# Patient Record
Sex: FEMALE | Race: WHITE | NOT HISPANIC OR LATINO | Employment: FULL TIME | URBAN - METROPOLITAN AREA
[De-identification: names, ages, dates, MRNs, and addresses within clinical notes are randomized per-mention and may not be internally consistent; named-entity substitution may affect disease eponyms.]

---

## 2024-09-04 DIAGNOSIS — R22.1 LOCALIZED SWELLING, MASS AND LUMP, NECK: Primary | ICD-10-CM

## 2024-10-03 ENCOUNTER — OFFICE VISIT (OUTPATIENT)
Dept: OTOLARYNGOLOGY | Facility: CLINIC | Age: 49
End: 2024-10-03

## 2024-10-03 VITALS
HEIGHT: 65 IN | SYSTOLIC BLOOD PRESSURE: 133 MMHG | BODY MASS INDEX: 25.05 KG/M2 | OXYGEN SATURATION: 100 % | DIASTOLIC BLOOD PRESSURE: 77 MMHG | WEIGHT: 150.38 LBS | RESPIRATION RATE: 20 BRPM | TEMPERATURE: 96 F | HEART RATE: 88 BPM

## 2024-10-03 DIAGNOSIS — Q18.0 BRANCHIAL CLEFT CYST: Primary | ICD-10-CM

## 2024-10-03 DIAGNOSIS — L02.11 ABSCESS, NECK: ICD-10-CM

## 2024-10-03 DIAGNOSIS — R22.1 LOCALIZED SWELLING, MASS AND LUMP, NECK: ICD-10-CM

## 2024-10-03 PROCEDURE — 99214 OFFICE O/P EST MOD 30 MIN: CPT | Mod: PBBFAC

## 2024-10-03 RX ORDER — AMOXICILLIN AND CLAVULANATE POTASSIUM 875; 125 MG/1; MG/1
1 TABLET, FILM COATED ORAL 2 TIMES DAILY
Qty: 20 TABLET | Refills: 0 | Status: SHIPPED | OUTPATIENT
Start: 2024-10-03 | End: 2024-10-13

## 2024-10-03 RX ORDER — ESCITALOPRAM OXALATE 20 MG/1
TABLET, FILM COATED ORAL
COMMUNITY
Start: 1999-10-29

## 2024-10-03 RX ORDER — LIDOCAINE HYDROCHLORIDE 40 MG/ML
SOLUTION TOPICAL
Status: DISCONTINUED
Start: 2024-10-03 | End: 2024-10-03 | Stop reason: HOSPADM

## 2024-10-03 RX ORDER — TRAZODONE HYDROCHLORIDE 100 MG/1
50 TABLET ORAL
COMMUNITY
Start: 2024-09-26

## 2024-10-03 NOTE — H&P (VIEW-ONLY)
[facility]  Otolaryngology-Head & Neck Surgery    Office Visit    Patient Name: Sheree Carter  MRN: [id number]  YOB: 1975  Date of Encounter: 10/03/2024  Physician: Lou Gomez MD      CC: right sided neck mass    HPI: Sheree Carter is a 49 y.o. female who presents with new right sided neck mass. She states it has been present since June and has been slowly growing. She states it began after a right sided ear infection that she took antibiotics for. She denies any other previous head or neck masses or surgeries. She denies skin changes. She reports it grows in size throughout the day and the shrinks overnight. She denies any heart or lung problems. No family history of any similar problems. She reports she does smoke, about 3 cigarettes a day for about 30 years. She denies alcohol or illicit drug use. She is not sexually active. She denies dyspnea, dysphagia, dysphonia. She does reports some right sided nasal congestion and ear fullness.    Review of patient's allergies indicates:  No Known Allergies    No past medical history on file.    No past surgical history on file.    Social History     Socioeconomic History    Marital status: Single   Tobacco Use    Smoking status: Some Days     Types: Cigarettes    Smokeless tobacco: Never       No family history on file.    PHYSICAL EXAM:  Vitals:    10/03/24 0911   BP: 133/77   Pulse: 88   Resp: 20   Temp: 96.3 °F (35.7 °C)       General Appearance: well nourished, well-developed, alert, oriented, in no acute distress, no dysphonia  Head/Face: Normocephalic, atraumatic  Eyes: EOMI, normal conjunctiva  Ears: Hears well at normal conversation volume  Otomicroscopy:  AD: external normal, ear canal normal, TM intact without effusion or retraction  AS: external normal, ear canal normal, TM intact without effusion or retraction  Nose: External nose normal, septum midline, mild inferior turbinate hypertrophy, no epistaxis  Oral  Cavity & Oropharynx: Lips normal. Tongue without masses or lesions. Dentition fair. Oropharynx unremarkable. No masses, lesions, or leukoplakia. Floor of mouth and base of tongue are soft.   Neck: right-sided neck mass along SCM, well circumscribed, no fluctuant, no skin changes, not tender palpation, Soft, no palpable lymph nodes. Thyroid without nodules or goiter.   Respiratory: Nonlabored breathing on room air. No stridor or stertor.   Neuro: CN II-XII intact.     PROCEDURE: Flexible fiberoptic laryngoscopy    Surgeon: Lou Gomez   Complications: None  Date: 10/03/2024  Indication: neck mass    Procedure in Detail:    Informed consent was obtained from the patient after explanation of procedure, indications, risks and benefits. Flexible laryngoscopy was performed on [facility] through the right nasal passage(s). The nasal cavity, nasopharynx, oropharynx, hypopharynx and larynx were adequately visualized. The true vocal cords and arytenoids were examined during phonation and repose.    Operative Findings:    Nasal Cavity: nasal airway patent without lesions or ulcerations  Nasopharynx: No adenoid hypertrophy, no masses or ulcerations noted in the fossae of Rosenmüller, patent appearing carla tubarii  Tongue Base: No fullness or lingual tonsillar hypertrophy. No masses.  Pharyngeal Walls: No lesions or ulcerations noted  Epiglottis / Aryepiglottic Folds: Crisp epiglottis without lesions, normal-appearing aryepiglottic folds without lesions.   Arytenoids: Full symmetric range of motion bilaterally, no lesions  Piriform Sinus: No lesions or masses noted  Vocal Cords: Symmetric movement bilaterally with good glottic closure, patent airway during inspiration and expiration, no lesions or masses    The patient tolerated the procedure well without any complications.    PROCEDURE: Fine Needle Aspiration    Surgeon: Lou Gomez and Osiel Jarvis  Complications: None  Date: 10/03/2024  Indication: neck  mass    Procedure in Detail:    Informed consent was obtained from patient after explanation of procedure, indications, risks, and benefits. Area was numbed with 5 cc of 1% lidocaine with epinephrine and then sterilely prepped and draped. Using a 22G and 18G needle, fluid was aspirated from within the mass and placed along slides to be sent for cytology as well as cultures obtained. Approximately 25cc of purulence was aspirated. She tolerated the procedure well and without complications.      PERTINENT DATA:  [facility] CT films reviewed: right sided well circumscribed fluid filled mass    ASSESSMENT:  Sheree Carter is a 49 y.o. 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 In office FNA performed with purulence aspirated. Cultures obtained.    PLAN:  -- Follow up cytology and cultures from needle aspiration performed in clinic  -- Augmentin prescription sent to patient's pharmacy    Return to clinic in 1 week    Lou Gomez MD  [employer] Otolaryngology PGY-3  9:46 AM 10/03/2024

## 2024-10-04 DIAGNOSIS — R22.1 LOCALIZED SWELLING, MASS AND LUMP, NECK: Primary | ICD-10-CM

## 2024-10-05 LAB
BACTERIA SPEC ANAEROBE CULT: NORMAL
BACTERIA WND CULT: NORMAL

## 2024-10-10 ENCOUNTER — OFFICE VISIT (OUTPATIENT)
Dept: OTOLARYNGOLOGY | Facility: CLINIC | Age: 49
End: 2024-10-10

## 2024-10-10 DIAGNOSIS — R22.1 LOCALIZED SWELLING, MASS AND LUMP, NECK: Primary | ICD-10-CM

## 2024-10-10 DIAGNOSIS — Q18.0 BRANCHIAL CLEFT CYST: ICD-10-CM

## 2024-10-10 PROCEDURE — 99213 OFFICE O/P EST LOW 20 MIN: CPT | Mod: PBBFAC | Performed by: OTOLARYNGOLOGY

## 2024-10-10 RX ORDER — CEFAZOLIN SODIUM 2 G/50ML
2 SOLUTION INTRAVENOUS
OUTPATIENT
Start: 2024-10-10

## 2024-10-10 RX ORDER — AMOXICILLIN AND CLAVULANATE POTASSIUM 875; 125 MG/1; MG/1
TABLET, FILM COATED ORAL
Qty: 20 TABLET | Refills: 0 | Status: SHIPPED | OUTPATIENT
Start: 2024-10-10

## 2024-10-10 NOTE — PROGRESS NOTES
[facility]  Otolaryngology-Head & Neck Surgery    Office Visit    Patient Name: Sheree Carter  MRN: [id number]  YOB: 1975  Date of Encounter: 10/10/2024  Physician: Osiel Jarvis MD      CC:  Follow-up for right neck mass    HPI: 10/3/2024:  Sheree Carter is a 49 y.o. female who presents with new right sided neck mass. She states it has been present since June and has been slowly growing. She states it began after a right sided ear infection that she took antibiotics for. She denies any other previous head or neck masses or surgeries. She denies skin changes. She reports it grows in size throughout the day and the shrinks overnight. She denies any heart or lung problems. No family history of any similar problems. She reports she does smoke, about 3 cigarettes a day for about 30 years. She denies alcohol or illicit drug use. She is not sexually active. She denies dyspnea, dysphagia, dysphonia. She does reports some right sided nasal congestion and ear fullness.    10/10/2024:   Patient presents today for follow-up in review FNA and culture results from the right neck mass.  Patient has noticed a recurrence of the swelling following her needle aspiration last week.  She has no pain, tenderness, fever, or drainage.  She does not have any other new problems today.  FNA results of the right neck mass did show squamous epithelial cells, inflammatory cells, and macrophages in a background of debris.  Differential diagnosis was noted include branchial cleft cyst but there is also possibility of cystic squamous cell carcinoma.  Culture showed no growth.  Results were discussed with the patient.    Review of patient's allergies indicates:  No Known Allergies    History reviewed. No pertinent past medical history.    History reviewed. No pertinent surgical history.    Social History     Socioeconomic History    Marital status: Single   Tobacco Use    Smoking status: Some  Days     Types: Cigarettes    Smokeless tobacco: Never       No family history on file.    PHYSICAL EXAM:  There were no vitals filed for this visit.      General Appearance: well nourished, well-developed, alert, oriented, in no acute distress, no dysphonia  Head/Face: Normocephalic, atraumatic  Eyes: EOMI, normal conjunctiva  Ears: Hears well at normal conversation volume  Otomicroscopy:  AD: external normal, ear canal normal, TM intact without effusion or retraction  AS: external normal, ear canal normal, TM intact without effusion or retraction  Nose: External nose normal, septum midline, mild inferior turbinate hypertrophy, no epistaxis  Oral Cavity & Oropharynx: Lips normal. Tongue without masses or lesions. Dentition fair. Oropharynx unremarkable. No masses, lesions, or leukoplakia. Floor of mouth and base of tongue are soft.   Neck: right-sided neck mass along SCM, well circumscribed, fluctuant, no skin changes, not tender palpation, Soft, no palpable lymph nodes. Thyroid without nodules or goiter.   Respiratory: Nonlabored breathing on room air. No stridor or stertor.   Neuro: CN II-XII intact.     PROCEDURE: Flexible fiberoptic laryngoscopy (date:10/3/2024)    Complications: None  Indication: neck mass    Procedure in Detail:    Informed consent was obtained from the patient after explanation of procedure, indications, risks and benefits. Flexible laryngoscopy was performed on [facility] through the right nasal passage(s). The nasal cavity, nasopharynx, oropharynx, hypopharynx and larynx were adequately visualized. The true vocal cords and arytenoids were examined during phonation and repose.    Operative Findings:    Nasal Cavity: nasal airway patent without lesions or ulcerations  Nasopharynx: No adenoid hypertrophy, no masses or ulcerations noted in the fossae of Rosenmüller, patent appearing carla tubarii  Tongue Base: No fullness or lingual tonsillar hypertrophy. No masses.  Pharyngeal Walls: No  lesions or ulcerations noted  Epiglottis / Aryepiglottic Folds: Crisp epiglottis without lesions, normal-appearing aryepiglottic folds without lesions.   Arytenoids: Full symmetric range of motion bilaterally, no lesions  Piriform Sinus: No lesions or masses noted  Vocal Cords: Symmetric movement bilaterally with good glottic closure, patent airway during inspiration and expiration, no lesions or masses    The patient tolerated the procedure well without any complications.      PERTINENT DATA:  OS[facility] CT films reviewed: right sided well circumscribed fluid filled mass    ASSESSMENT:  49-year-old female with several month history of right-sided neck mass which is likely a branchial cleft cyst but the possibility of squamous cell carcinoma also exist.    PLAN:  Recommend direct laryngoscopy for further evaluation of the upper aerodigestive tract with biopsies of any area that are suspicious for carcinoma.  If no suspicious areas are noted would then proceed with excision of the right cervical mass/cyst.  If there is a suspicious lesion present then would perform the direct laryngoscopy and biopsies only and defer further treatment of the neck mass until final pathology results were available.  Patient does understand and does agree to this approach.  Risks of the procedure including bleeding, infection, injury to the teeth, injury to the spinal accessory nerve with shoulder weakness, injury to the marginal mandibular nerve with lip weakness, injury to the hypoglossal nerve with altered speech and difficulty swallowing, injury to the vagus nerve with hoarseness, and injury to the cervical sympathetic trunk with Ekaterina syndrome was also discussed.  Is also discuss that dysphagia maybe a postoperative complication if there is injury to the 9 and 10.  Patient understands and would like to proceed.  Surgery is scheduled for 11/1/2024.  She will be placed on Augmentin 875 1 tablet p.o. b.i.d. to begin 1 week preoperatively.   He will be seen for follow-up 3-4 days postoperatively for drain pull.  Consent was obtained in clinic.    Osiel Jarvis MD

## 2024-10-17 ENCOUNTER — ANESTHESIA EVENT (OUTPATIENT)
Dept: SURGERY | Facility: HOSPITAL | Age: 49
End: 2024-10-17

## 2024-10-31 ENCOUNTER — PATIENT MESSAGE (OUTPATIENT)
Dept: SURGERY | Facility: HOSPITAL | Age: 49
End: 2024-10-31

## 2024-11-01 ENCOUNTER — HOSPITAL ENCOUNTER (OUTPATIENT)
Facility: HOSPITAL | Age: 49
Discharge: HOME | End: 2024-11-01
Attending: OTOLARYNGOLOGY | Admitting: OTOLARYNGOLOGY

## 2024-11-01 ENCOUNTER — ANESTHESIA (OUTPATIENT)
Dept: SURGERY | Facility: HOSPITAL | Age: 49
End: 2024-11-01

## 2024-11-01 DIAGNOSIS — Q18.0 BRANCHIAL CLEFT CYST: ICD-10-CM

## 2024-11-01 DIAGNOSIS — R22.1 LOCALIZED SWELLING, MASS AND LUMP, NECK: ICD-10-CM

## 2024-11-01 DIAGNOSIS — R22.1 MASS OF LATERAL NECK: ICD-10-CM

## 2024-11-01 DIAGNOSIS — Z01.818 PRE-OP EVALUATION: ICD-10-CM

## 2024-11-01 LAB
B-HCG UR QL: NEGATIVE
BASOPHILS # BLD AUTO: 0.07 X10(3)/MCL
BASOPHILS NFR BLD AUTO: 1.3 %
CTP QC/QA: YES
EOSINOPHIL # BLD AUTO: 0.24 X10(3)/MCL (ref 0–0.9)
EOSINOPHIL NFR BLD AUTO: 4.4 %
ERYTHROCYTE [DISTWIDTH] IN BLOOD BY AUTOMATED COUNT: 12.3 % (ref 11.5–17)
HCT VFR BLD AUTO: 37.2 % (ref 37–47)
HGB BLD-MCNC: 12.7 G/DL (ref 12–16)
IMM GRANULOCYTES # BLD AUTO: 0 X10(3)/MCL (ref 0–0.04)
IMM GRANULOCYTES NFR BLD AUTO: 0 %
LYMPHOCYTES # BLD AUTO: 2.39 X10(3)/MCL (ref 0.6–4.6)
LYMPHOCYTES NFR BLD AUTO: 43.9 %
MCH RBC QN AUTO: 30.8 PG (ref 27–31)
MCHC RBC AUTO-ENTMCNC: 34.1 G/DL (ref 33–36)
MCV RBC AUTO: 90.3 FL (ref 80–94)
MONOCYTES # BLD AUTO: 0.53 X10(3)/MCL (ref 0.1–1.3)
MONOCYTES NFR BLD AUTO: 9.7 %
NEUTROPHILS # BLD AUTO: 2.22 X10(3)/MCL (ref 2.1–9.2)
NEUTROPHILS NFR BLD AUTO: 40.7 %
NRBC BLD AUTO-RTO: 0 %
PLATELET # BLD AUTO: 222 X10(3)/MCL (ref 130–400)
PMV BLD AUTO: 11.3 FL (ref 7.4–10.4)
RBC # BLD AUTO: 4.12 X10(6)/MCL (ref 4.2–5.4)
WBC # BLD AUTO: 5.45 X10(3)/MCL (ref 4.5–11.5)

## 2024-11-01 PROCEDURE — 63600175 PHARM REV CODE 636 W HCPCS: Performed by: OTOLARYNGOLOGY

## 2024-11-01 PROCEDURE — 36000709 HC OR TIME LEV III EA ADD 15 MIN: Performed by: OTOLARYNGOLOGY

## 2024-11-01 PROCEDURE — 36000708 HC OR TIME LEV III 1ST 15 MIN: Performed by: OTOLARYNGOLOGY

## 2024-11-01 PROCEDURE — 93005 ELECTROCARDIOGRAM TRACING: CPT

## 2024-11-01 PROCEDURE — 27201423 OPTIME MED/SURG SUP & DEVICES STERILE SUPPLY: Performed by: OTOLARYNGOLOGY

## 2024-11-01 PROCEDURE — 88305 TISSUE EXAM BY PATHOLOGIST: CPT | Mod: TC | Performed by: OTOLARYNGOLOGY

## 2024-11-01 PROCEDURE — 71000016 HC POSTOP RECOV ADDL HR: Performed by: OTOLARYNGOLOGY

## 2024-11-01 PROCEDURE — 81025 URINE PREGNANCY TEST: CPT | Performed by: NURSE PRACTITIONER

## 2024-11-01 PROCEDURE — D9220A PRA ANESTHESIA: Mod: ,,, | Performed by: SPECIALIST

## 2024-11-01 PROCEDURE — 85025 COMPLETE CBC W/AUTO DIFF WBC: CPT | Performed by: OTOLARYNGOLOGY

## 2024-11-01 PROCEDURE — 63600175 PHARM REV CODE 636 W HCPCS: Performed by: NURSE ANESTHETIST, CERTIFIED REGISTERED

## 2024-11-01 PROCEDURE — 63600175 PHARM REV CODE 636 W HCPCS: Performed by: SPECIALIST

## 2024-11-01 PROCEDURE — 25000003 PHARM REV CODE 250: Performed by: NURSE ANESTHETIST, CERTIFIED REGISTERED

## 2024-11-01 PROCEDURE — 25000003 PHARM REV CODE 250: Performed by: SPECIALIST

## 2024-11-01 PROCEDURE — 37000008 HC ANESTHESIA 1ST 15 MINUTES: Performed by: OTOLARYNGOLOGY

## 2024-11-01 PROCEDURE — 37000009 HC ANESTHESIA EA ADD 15 MINS: Performed by: OTOLARYNGOLOGY

## 2024-11-01 PROCEDURE — 71000015 HC POSTOP RECOV 1ST HR: Performed by: OTOLARYNGOLOGY

## 2024-11-01 PROCEDURE — 71000033 HC RECOVERY, INTIAL HOUR: Performed by: OTOLARYNGOLOGY

## 2024-11-01 RX ORDER — PROPOFOL 10 MG/ML
VIAL (ML) INTRAVENOUS
Status: DISCONTINUED | OUTPATIENT
Start: 2024-11-01 | End: 2024-11-01

## 2024-11-01 RX ORDER — HYDROMORPHONE HYDROCHLORIDE 1 MG/ML
0.2 INJECTION, SOLUTION INTRAMUSCULAR; INTRAVENOUS; SUBCUTANEOUS EVERY 5 MIN PRN
Status: DISCONTINUED | OUTPATIENT
Start: 2024-11-01 | End: 2024-11-01 | Stop reason: HOSPADM

## 2024-11-01 RX ORDER — GLUCAGON 1 MG
1 KIT INJECTION
Status: DISCONTINUED | OUTPATIENT
Start: 2024-11-01 | End: 2024-11-01 | Stop reason: HOSPADM

## 2024-11-01 RX ORDER — HYDROMORPHONE HYDROCHLORIDE 1 MG/ML
0.5 INJECTION, SOLUTION INTRAMUSCULAR; INTRAVENOUS; SUBCUTANEOUS EVERY 5 MIN PRN
Status: DISCONTINUED | OUTPATIENT
Start: 2024-11-01 | End: 2024-11-01 | Stop reason: HOSPADM

## 2024-11-01 RX ORDER — MIDAZOLAM HYDROCHLORIDE 2 MG/2ML
2 INJECTION, SOLUTION INTRAMUSCULAR; INTRAVENOUS ONCE AS NEEDED
Status: COMPLETED | OUTPATIENT
Start: 2024-11-01 | End: 2024-11-01

## 2024-11-01 RX ORDER — AMOXICILLIN AND CLAVULANATE POTASSIUM 875; 125 MG/1; MG/1
1 TABLET, FILM COATED ORAL EVERY 12 HOURS
Qty: 10 TABLET | Refills: 0 | Status: SHIPPED | OUTPATIENT
Start: 2024-11-01 | End: 2024-11-06

## 2024-11-01 RX ORDER — IBUPROFEN 800 MG/1
800 TABLET ORAL EVERY 6 HOURS PRN
Qty: 30 TABLET | Refills: 0 | Status: SHIPPED | OUTPATIENT
Start: 2024-11-01

## 2024-11-01 RX ORDER — KETAMINE HCL IN 0.9 % NACL 50 MG/5 ML
SYRINGE (ML) INTRAVENOUS
Status: DISCONTINUED | OUTPATIENT
Start: 2024-11-01 | End: 2024-11-01

## 2024-11-01 RX ORDER — MEPERIDINE HYDROCHLORIDE 25 MG/ML
12.5 INJECTION INTRAMUSCULAR; INTRAVENOUS; SUBCUTANEOUS ONCE
Status: DISCONTINUED | OUTPATIENT
Start: 2024-11-01 | End: 2024-11-01 | Stop reason: HOSPADM

## 2024-11-01 RX ORDER — ACETAMINOPHEN 325 MG/1
650 TABLET ORAL EVERY 6 HOURS PRN
Qty: 60 TABLET | Refills: 0 | Status: SHIPPED | OUTPATIENT
Start: 2024-11-01

## 2024-11-01 RX ORDER — DIPHENHYDRAMINE HYDROCHLORIDE 50 MG/ML
25 INJECTION INTRAMUSCULAR; INTRAVENOUS ONCE AS NEEDED
Status: DISCONTINUED | OUTPATIENT
Start: 2024-11-01 | End: 2024-11-01 | Stop reason: HOSPADM

## 2024-11-01 RX ORDER — ONDANSETRON HYDROCHLORIDE 2 MG/ML
INJECTION, SOLUTION INTRAVENOUS
Status: DISCONTINUED | OUTPATIENT
Start: 2024-11-01 | End: 2024-11-01

## 2024-11-01 RX ORDER — ROCURONIUM BROMIDE 10 MG/ML
INJECTION, SOLUTION INTRAVENOUS
Status: DISCONTINUED | OUTPATIENT
Start: 2024-11-01 | End: 2024-11-01

## 2024-11-01 RX ORDER — PHENYLEPHRINE HYDROCHLORIDE 10 MG/ML
INJECTION INTRAVENOUS
Status: DISCONTINUED | OUTPATIENT
Start: 2024-11-01 | End: 2024-11-01

## 2024-11-01 RX ORDER — PROCHLORPERAZINE EDISYLATE 5 MG/ML
5 INJECTION INTRAMUSCULAR; INTRAVENOUS ONCE AS NEEDED
Status: DISCONTINUED | OUTPATIENT
Start: 2024-11-01 | End: 2024-11-01 | Stop reason: HOSPADM

## 2024-11-01 RX ORDER — SODIUM CHLORIDE, SODIUM LACTATE, POTASSIUM CHLORIDE, CALCIUM CHLORIDE 600; 310; 30; 20 MG/100ML; MG/100ML; MG/100ML; MG/100ML
INJECTION, SOLUTION INTRAVENOUS CONTINUOUS
OUTPATIENT
Start: 2024-11-01

## 2024-11-01 RX ORDER — DEXAMETHASONE SODIUM PHOSPHATE 4 MG/ML
INJECTION, SOLUTION INTRA-ARTICULAR; INTRALESIONAL; INTRAMUSCULAR; INTRAVENOUS; SOFT TISSUE
Status: DISCONTINUED | OUTPATIENT
Start: 2024-11-01 | End: 2024-11-01

## 2024-11-01 RX ORDER — EPHEDRINE SULFATE 50 MG/ML
INJECTION, SOLUTION INTRAVENOUS
Status: DISCONTINUED | OUTPATIENT
Start: 2024-11-01 | End: 2024-11-01

## 2024-11-01 RX ORDER — IPRATROPIUM BROMIDE AND ALBUTEROL SULFATE 2.5; .5 MG/3ML; MG/3ML
3 SOLUTION RESPIRATORY (INHALATION) ONCE AS NEEDED
Status: DISCONTINUED | OUTPATIENT
Start: 2024-11-01 | End: 2024-11-01 | Stop reason: HOSPADM

## 2024-11-01 RX ORDER — ONDANSETRON 4 MG/1
4 TABLET, ORALLY DISINTEGRATING ORAL 2 TIMES DAILY
Qty: 10 TABLET | Refills: 0 | Status: SHIPPED | OUTPATIENT
Start: 2024-11-01

## 2024-11-01 RX ORDER — FENTANYL CITRATE 50 UG/ML
INJECTION, SOLUTION INTRAMUSCULAR; INTRAVENOUS
Status: DISCONTINUED | OUTPATIENT
Start: 2024-11-01 | End: 2024-11-01

## 2024-11-01 RX ORDER — OXYCODONE AND ACETAMINOPHEN 5; 325 MG/1; MG/1
2 TABLET ORAL ONCE
Status: COMPLETED | OUTPATIENT
Start: 2024-11-01 | End: 2024-11-01

## 2024-11-01 RX ORDER — LIDOCAINE HYDROCHLORIDE 20 MG/ML
INJECTION INTRAVENOUS
Status: DISCONTINUED | OUTPATIENT
Start: 2024-11-01 | End: 2024-11-01

## 2024-11-01 RX ORDER — CEFAZOLIN SODIUM 1 G/3ML
2 INJECTION, POWDER, FOR SOLUTION INTRAMUSCULAR; INTRAVENOUS
Status: COMPLETED | OUTPATIENT
Start: 2024-11-01 | End: 2024-11-01

## 2024-11-01 RX ORDER — ONDANSETRON HYDROCHLORIDE 2 MG/ML
4 INJECTION, SOLUTION INTRAVENOUS ONCE
Status: DISCONTINUED | OUTPATIENT
Start: 2024-11-01 | End: 2024-11-01 | Stop reason: HOSPADM

## 2024-11-01 RX ORDER — OXYCODONE HYDROCHLORIDE 5 MG/1
5 TABLET ORAL EVERY 4 HOURS PRN
Qty: 20 TABLET | Refills: 0 | Status: SHIPPED | OUTPATIENT
Start: 2024-11-01

## 2024-11-01 RX ADMIN — ONDANSETRON 4 MG: 2 INJECTION INTRAMUSCULAR; INTRAVENOUS at 09:11

## 2024-11-01 RX ADMIN — FENTANYL CITRATE 100 MCG: 50 INJECTION INTRAMUSCULAR; INTRAVENOUS at 08:11

## 2024-11-01 RX ADMIN — PROPOFOL 200 MG: 10 INJECTION, EMULSION INTRAVENOUS at 08:11

## 2024-11-01 RX ADMIN — ROCURONIUM BROMIDE 50 MG: 10 INJECTION INTRAVENOUS at 08:11

## 2024-11-01 RX ADMIN — MIDAZOLAM HYDROCHLORIDE 2 MG: 1 INJECTION, SOLUTION INTRAMUSCULAR; INTRAVENOUS at 07:11

## 2024-11-01 RX ADMIN — PHENYLEPHRINE HYDROCHLORIDE 100 MCG: 10 INJECTION INTRAVENOUS at 08:11

## 2024-11-01 RX ADMIN — SUGAMMADEX 200 MG: 100 INJECTION, SOLUTION INTRAVENOUS at 09:11

## 2024-11-01 RX ADMIN — EPHEDRINE SULFATE 25 MG: 50 INJECTION INTRAVENOUS at 08:11

## 2024-11-01 RX ADMIN — Medication 20 MG: at 09:11

## 2024-11-01 RX ADMIN — PHENYLEPHRINE HYDROCHLORIDE 200 MCG: 10 INJECTION INTRAVENOUS at 09:11

## 2024-11-01 RX ADMIN — Medication 30 MG: at 08:11

## 2024-11-01 RX ADMIN — DEXAMETHASONE SODIUM PHOSPHATE 8 MG: 4 INJECTION, SOLUTION INTRA-ARTICULAR; INTRALESIONAL; INTRAMUSCULAR; INTRAVENOUS; SOFT TISSUE at 08:11

## 2024-11-01 RX ADMIN — CEFAZOLIN 2 G: 330 INJECTION, POWDER, FOR SOLUTION INTRAMUSCULAR; INTRAVENOUS at 08:11

## 2024-11-01 RX ADMIN — OXYCODONE HYDROCHLORIDE AND ACETAMINOPHEN 1 TABLET: 5; 325 TABLET ORAL at 10:11

## 2024-11-01 RX ADMIN — LIDOCAINE HYDROCHLORIDE 100 MG: 20 INJECTION INTRAVENOUS at 08:11

## 2024-11-01 RX ADMIN — PHENYLEPHRINE HYDROCHLORIDE 100 MCG: 10 INJECTION INTRAVENOUS at 09:11

## 2024-11-01 NOTE — BRIEF OP NOTE
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 Adjacent lymph node also sent for permanent specimen.     Estimated Blood Loss: 25cc         Specimens:   ID Type Source Tests Collected by Time Destination   A : right neck mass Tissue Neck, Anterior SPECIMEN TO PATHOLOGY Osiel Jarvis MD 11/1/2024 0928    B : right level 2 lymph node Tissue Lymph Node SPECIMEN TO PATHOLOGY Osiel Jarvis MD 11/1/2024 0932          Discharge Note    OUTCOME: Patient tolerated treatment/procedure well without complication and is now ready for discharge.    DISPOSITION: Home or Self Care    FINAL DIAGNOSIS:  <principal problem not specified>    FOLLOWUP: In clinic    DISCHARGE INSTRUCTIONS:    Discharge Procedure Orders   Diet Adult Regular     Other restrictions (specify):   Order Comments: No heavy bending, lifting, or straining for 2 weeks. Ok to shower the day after surgery.     Notify your health care provider if you experience any of the following:  redness, tenderness, or signs of infection (pain, swelling, redness, odor or green/yellow discharge around incision site)     Notify your health care provider if you experience any of the following:  severe uncontrolled pain     Notify your health care provider if you experience any of the following:  temperature >100.4     Leave dressing on - Keep it clean, dry, and intact until clinic visit   Order Comments: Ok to shower, but leave  tape in place.     Sandip Tinsley MD  [facility] Otolaryngology PGY-5  11/01/2024 9:49 AM

## 2024-11-01 NOTE — DISCHARGE INSTRUCTIONS
"Ear, Nose, and Throat Postoperative Instructions    Activity: No heavy lifting, straining, or bending for 2 weeks. No heavy exercise for two weeks. You may shower starting 24 hours after your surgery.     Wound Care: Your incision will be covered with a type of surgical tape called "Steri-Strips." These will stay on for a week or two and will fall off on their own. Please do not try to pull them off. You can shower the next day after your surgery, and you can get the Steri-Strips wet, but please do not scrub the incision site hard. Just let the soap and warm water run over it and pat it to dry.     Medications: One of the best ways to control your pain is with Tylenol 650mg and ibuprofen 600-800mg. If these were not prescribed, they can be found over the counter. You can take these every 6 hours, as needed. It may be helpful to alternate them so that you are getting something for pain every three hours. An example schedule might look like this:    7:00am - ibuprofen  10:00am - Tylenol  1:00pm - ibuprofen  4:00pm - Tylenol  7:00pm - ibuprofen  10:00pm - Tylenol    We have prescribed other stronger pain medications for you after this surgery. You might not need these, but if you do, please take these as prescribed. If you were prescribed narcotics (e.g. Norco, oxycodone), then do not take these while driving or operating heavy machinery.     What to Watch For: Most patients do well after surgery and notice that their pain and swelling slowly gets better over the first week. However, there are a few things to watch out for that would raise concern and would indicate that you should call our office. Please call if you have swelling that gets worse, bleeding from your incision site (more than just a few spots), trouble breathing, & persistent trouble swallowing.     Follow Up: Please follow up with the ENT Team in 1 week. Your appointment will be at [facility] ([address], " [place] [address]). The ENT Clinic will call you with an appointment date and time. If you have any questions or concerns in the meantime, you may call the clinic at [phone]. If you have any questions or concerns after hours, call [phone] and you will be connected to an on-call ENT physician.

## 2024-11-01 NOTE — OP NOTE
[facility] OTOLARYNGOLOGY OPERATIVE REPORT    Patient Name: Sheree Carter  Date of Admission: 11/1/2024  YOB: 1975  Date of procedure: 11/01/2024    Attending Surgeon:   Dr. Jonathan Escalante    Resident Surgeon(s):   Luciano Florez MD, -IV  Sandip Tinsley MD, PGY-5    Pre-operative diagnosis:  1. Branchial cleft cyst      Post-operative diagnosis:   1. Same    Procedure:  1. Direct laryngoscopy  2. R branchial cleft cyst excision    Anesthesia: General    Blood Loss: 25 cc    Implants: None    Drains: None    Specimens:   ID Type Source Tests Collected by Time Destination   A : right neck mass Tissue Neck, Anterior SPECIMEN TO PATHOLOGY Osiel Jarvis MD 11/1/2024 0928    B : right level 2 lymph node Tissue Lymph Node SPECIMEN TO PATHOLOGY Osiel Jarvis MD 11/1/2024 0932        Complications: None    Findings:   No suspicious lesions on direct laryngoscopy  Cystic neck mass anterior/deep to external jugular measuring approximately 6 cm by 6 cm    Indication:  Sheree Carter is a 49 y.o. female with right sided neck swelling. All treatment options were discussed, including observation, medical management, and surgical intervention. Ultimately, the joint decision was made to proceed with direct laryngoscopy and neck cyst excision. Informed consent was obtained from the patient. All risks, benefits, and alternatives were reviewed, and all questions were answered.     Procedure in detail:    The patient was brought to the operating theater and placed in a supine position.  General endotracheal anesthesia was induced.     Mouth guard was placed. Deedo laryngoscope was introduced atraumatically with findings as above.     The area was prepped and draped in the usual sterile fashion.  Timeout was performed confirming correct patient, site, and procedure.  Perioperative antibiotics were administered.     A 6 cm incision was made with 15 blade. Dissection was carried down to platysmal layer.  Sub-platysmal flaps were raised in all directions. The fascia overlying the cyst was incised and dissected carefully off the external jugular vein. Careful dissection was carried to remove attachments surrounding the cyst. No identifiable tract was encountered. Once freely mobilized, the cyst was completely excised. A small lymph node was encountered deep in the wound bed and sent for permanent specimen as well. The wound was then thoroughly irrigated and Valsalva was performed. Hemostasis was achieved. Closure of deep fascia layer with 3-0 Vicryl. Closed platysmal layer with 3-0 Vicryl. Skin closed with 4-0 Monocryl. Mastasol and steri strips applied.      The patient was then returned to the anesthesia team for emergence. After extubation, the patient was awakened and rolled to PACU in a stable condition, having suffered no untoward events.    Dr. Escalante was present and participated in every step of this surgical procedure.    Luciano Florez MD  [facility] Otolaryngology, HO-IV  11/1/2024 10:07 AM

## 2024-11-04 VITALS
SYSTOLIC BLOOD PRESSURE: 118 MMHG | OXYGEN SATURATION: 96 % | RESPIRATION RATE: 20 BRPM | TEMPERATURE: 97 F | HEART RATE: 90 BPM | WEIGHT: 149.38 LBS | BODY MASS INDEX: 24.89 KG/M2 | HEIGHT: 65 IN | DIASTOLIC BLOOD PRESSURE: 72 MMHG

## 2024-11-04 LAB
OHS QRS DURATION: 84 MS
OHS QTC CALCULATION: 424 MS

## 2024-11-06 LAB
DHEA SERPL-MCNC: NORMAL
ESTROGEN SERPL-MCNC: NORMAL PG/ML
INSULIN SERPL-ACNC: NORMAL U[IU]/ML
LAB AP CLINICAL INFORMATION: NORMAL
LAB AP GROSS DESCRIPTION: NORMAL
LAB AP REPORT FOOTNOTES: NORMAL
T3RU NFR SERPL: NORMAL %

## 2024-11-07 ENCOUNTER — OFFICE VISIT (OUTPATIENT)
Dept: OTOLARYNGOLOGY | Facility: CLINIC | Age: 49
End: 2024-11-07

## 2024-11-07 VITALS
WEIGHT: 149 LBS | TEMPERATURE: 99 F | HEIGHT: 65 IN | RESPIRATION RATE: 18 BRPM | OXYGEN SATURATION: 98 % | DIASTOLIC BLOOD PRESSURE: 89 MMHG | HEART RATE: 84 BPM | SYSTOLIC BLOOD PRESSURE: 125 MMHG | BODY MASS INDEX: 24.83 KG/M2

## 2024-11-07 DIAGNOSIS — Q18.0 BRANCHIAL CLEFT CYST: Primary | ICD-10-CM

## 2024-11-07 PROCEDURE — 99214 OFFICE O/P EST MOD 30 MIN: CPT | Mod: PBBFAC | Performed by: STUDENT IN AN ORGANIZED HEALTH CARE EDUCATION/TRAINING PROGRAM

## 2024-11-07 NOTE — LETTER
November 7, 2024      [facility], Entr[facility]  [address] [address]  Phone: [phone]       Patient: Sheree Carter   YOB: 1975  Date of Visit: 11/07/2024    To Whom It May Concern:    Adam Carter  was at [facility] on 11/07/2024. The patient may return to work on 11/11/2024 with no restrictions. If you have any questions or concerns, or if I can be of further assistance, please do not hesitate to contact me.    Sincerely,    Ernestina Bazzi MA

## 2024-11-07 NOTE — PROGRESS NOTES
[facility]  Otolaryngology-Head & Neck Surgery    Office Visit    Patient Name: Sheree Carter  MRN: [id number]  YOB: 1975  Date of Encounter: 11/07/2024  Physician: Luciano Florez MD      CC:  Follow-up for right neck mass    HPI: 10/3/2024:  Sheree Carter is a 49 y.o. female who presents with new right sided neck mass. She states it has been present since June and has been slowly growing. She states it began after a right sided ear infection that she took antibiotics for. She denies any other previous head or neck masses or surgeries. She denies skin changes. She reports it grows in size throughout the day and the shrinks overnight. She denies any heart or lung problems. No family history of any similar problems. She reports she does smoke, about 3 cigarettes a day for about 30 years. She denies alcohol or illicit drug use. She is not sexually active. She denies dyspnea, dysphagia, dysphonia. She does reports some right sided nasal congestion and ear fullness.    10/10/2024:   Patient presents today for follow-up in review FNA and culture results from the right neck mass.  Patient has noticed a recurrence of the swelling following her needle aspiration last week.  She has no pain, tenderness, fever, or drainage.  She does not have any other new problems today.  FNA results of the right neck mass did show squamous epithelial cells, inflammatory cells, and macrophages in a background of debris.  Differential diagnosis was noted include branchial cleft cyst but there is also possibility of cystic squamous cell carcinoma.  Culture showed no growth.  Results were discussed with the patient.    11/7/24: Presents in post-op. Complains of some persistent pain, heaviness of right neck. Numbness to incision as well. Sore throat improving.     Review of patient's allergies indicates:  No Known Allergies    No past medical history on file.    Past Surgical History:    Procedure Laterality Date    APPENDECTOMY  2004    DIRECT LARYNGOSCOPY N/A 11/1/2024    Procedure: LARYNGOSCOPY, DIRECT;  Surgeon: Osiel Jarvis MD;  Location: [facility] OR;  Service: ENT;  Laterality: N/A;    EXCISION OF BRANCHIAL CLEFT CYST Right 11/1/2024    Procedure: EXCISION, BRANCHIAL CLEFT CYST;  Surgeon: Osiel Jarvis MD;  Location: [facility] OR;  Service: ENT;  Laterality: Right;       Social History     Socioeconomic History    Marital status: Single   Tobacco Use    Smoking status: Some Days     Types: Cigarettes    Smokeless tobacco: Never   Substance and Sexual Activity    Alcohol use: Never    Drug use: Never    Sexual activity: Not Currently       Family History   Problem Relation Name Age of Onset    Heart attack Father         PHYSICAL EXAM:  Vitals:    11/07/24 1344   BP: 125/89   Pulse: 84   Resp: 18   Temp: 98.9 °F (37.2 °C)         General Appearance: well nourished, well-developed, alert, oriented, in no acute distress, no dysphonia  Head/Face: Normocephalic, atraumatic  Eyes: EOMI, normal conjunctiva  Ears: Hears well at normal conversation volume  Otomicroscopy:  AD: external normal, ear canal normal, TM intact without effusion or retraction  AS: external normal, ear canal normal, TM intact without effusion or retraction  Nose: External nose normal, septum midline, mild inferior turbinate hypertrophy, no epistaxis  Oral Cavity & Oropharynx: Lips normal. Tongue without masses or lesions. Dentition fair. Oropharynx unremarkable. No masses, lesions, or leukoplakia. Floor of mouth and base of tongue are soft.   Neck: right-sided neck mass along SCM, well circumscribed, fluctuant, no skin changes, not tender palpation, Soft, no palpable lymph nodes. Thyroid without nodules or goiter.   Respiratory: Nonlabored breathing on room air. No stridor or stertor.   Neuro: CN II-XII intact.     PERTINENT DATA:  OS[facility] CT films reviewed: right sided well circumscribed fluid filled mass    Path:  Final  Diagnosis   1. Right neck mass:   - Benign cyst with chronic inflammation and lymphoid aggregates within the cyst wall.  These features may be seen in branchial cleft cyst.  Clinically correlate          2. Right level 2 lymph node:   - Lymph node with  reactive appearing changes.            ASSESSMENT:  49-year-old female with several month history of right-sided neck mass which is likely a branchial cleft cyst but the possibility of squamous cell carcinoma also exist.    Path benign.     PLAN:  Ct gentle massage to area. Elevate HOB, ice packs.   Aquaphor to incision.   RTC 1 month for re-eval.     Luciano Florez MD  [employer] ENT, PGY-4

## 2025-02-19 NOTE — PROGRESS NOTES
RE: Plan of Care    Montrell Castro MD    Thank you for referring Glen Carreno Jr.. The following information reflects my assessment and plan of care.    Please review and sign the attached form to indicate your approval of the plan of care. Insurance compliance requires your approval be on this plan of care. After your review, please fax back all pages received. Should you have any questions, feel free to contact me.    Klarissa Weller PT  [facility] [place]  [address] [facility] [address]  Phone: [phone]  Fax: [phone]           Plan of Care 25   Effective from: 2025  Effective to: 2025    Plan ID: [id number]                Participants as of Finalize on 2025      Name Type Comments Contact Info    Anjali Zendejas MD PCP - General  [phone]    Klarissa Weller PT Physical Therapist                 Ev Carranza, Glen KENT \"Pat\" MRN:[id number] (:1946 78 year old M)                 Evaluation       Author: Klarissa Weller PT Status: Signed Last edited: 2025 10:45 AM         Physical Therapy Evaluation    Visit Type: Initial Evaluation  Visit: 1  Referring Provider: Anjali Zendejas MD  Medical Diagnosis (from order): M48.061 - Spinal stenosis, lumbar region, without neurogenic claudication   Treatment Diagnosis: lumbar - increased pain/symptoms, impaired strength, impaired posture, impaired range of motion, impaired muscle length/flexibility, impaired gait, impaired mobility, impaired activity tolerance, impaired balance, increased risk for falls, impaired body mechanics and impaired sensation.  Onset  - Date of Surgery:  2025  - Procedure: L2-L3 bilateral hemilaminectomy, partial facetectomy, foraminotomies    Diagnosis Precautions: No lifting over 15 pounds for 3 months per patient  Chart reviewed at time of initial evaluation (relevant co-morbidities, allergies, tests and medications listed):   diabetes and    [facility]  Otolaryngology-Head & Neck Surgery    Office Visit    Patient Name: Sheree Carter  MRN: [id number]  YOB: 1975  Date of Encounter: 10/03/2024  Physician: Lou Gomez MD      CC: right sided neck mass    HPI: Sheree Carter is a 49 y.o. female who presents with new right sided neck mass. She states it has been present since June and has been slowly growing. She states it began after a right sided ear infection that she took antibiotics for. She denies any other previous head or neck masses or surgeries. She denies skin changes. She reports it grows in size throughout the day and the shrinks overnight. She denies any heart or lung problems. No family history of any similar problems. She reports she does smoke, about 3 cigarettes a day for about 30 years. She denies alcohol or illicit drug use. She is not sexually active. She denies dyspnea, dysphagia, dysphonia. She does reports some right sided nasal congestion and ear fullness.    Review of patient's allergies indicates:  No Known Allergies    No past medical history on file.    No past surgical history on file.    Social History     Socioeconomic History    Marital status: Single   Tobacco Use    Smoking status: Some Days     Types: Cigarettes    Smokeless tobacco: Never       No family history on file.    PHYSICAL EXAM:  Vitals:    10/03/24 0911   BP: 133/77   Pulse: 88   Resp: 20   Temp: 96.3 °F (35.7 °C)       General Appearance: well nourished, well-developed, alert, oriented, in no acute distress, no dysphonia  Head/Face: Normocephalic, atraumatic  Eyes: EOMI, normal conjunctiva  Ears: Hears well at normal conversation volume  Otomicroscopy:  AD: external normal, ear canal normal, TM intact without effusion or retraction  AS: external normal, ear canal normal, TM intact without effusion or retraction  Nose: External nose normal, septum midline, mild inferior turbinate hypertrophy, no epistaxis  Oral  Cavity & Oropharynx: Lips normal. Tongue without masses or lesions. Dentition fair. Oropharynx unremarkable. No masses, lesions, or leukoplakia. Floor of mouth and base of tongue are soft.   Neck: right-sided neck mass along SCM, well circumscribed, no fluctuant, no skin changes, not tender palpation, Soft, no palpable lymph nodes. Thyroid without nodules or goiter.   Respiratory: Nonlabored breathing on room air. No stridor or stertor.   Neuro: CN II-XII intact.     PROCEDURE: Flexible fiberoptic laryngoscopy    Surgeon: Lou Gomez   Complications: None  Date: 10/03/2024  Indication: neck mass    Procedure in Detail:    Informed consent was obtained from the patient after explanation of procedure, indications, risks and benefits. Flexible laryngoscopy was performed on [facility] through the right nasal passage(s). The nasal cavity, nasopharynx, oropharynx, hypopharynx and larynx were adequately visualized. The true vocal cords and arytenoids were examined during phonation and repose.    Operative Findings:    Nasal Cavity: nasal airway patent without lesions or ulcerations  Nasopharynx: No adenoid hypertrophy, no masses or ulcerations noted in the fossae of Rosenmüller, patent appearing carla tubarii  Tongue Base: No fullness or lingual tonsillar hypertrophy. No masses.  Pharyngeal Walls: No lesions or ulcerations noted  Epiglottis / Aryepiglottic Folds: Crisp epiglottis without lesions, normal-appearing aryepiglottic folds without lesions.   Arytenoids: Full symmetric range of motion bilaterally, no lesions  Piriform Sinus: No lesions or masses noted  Vocal Cords: Symmetric movement bilaterally with good glottic closure, patent airway during inspiration and expiration, no lesions or masses    The patient tolerated the procedure well without any complications.    PROCEDURE: Fine Needle Aspiration    Surgeon: Lou Gomez and Osiel Jarvis  Complications: None  Date: 10/03/2024  Indication: neck  hypertension  Diabetic neuropathy of hands and feet  Had L3 and L4 laminectomy x5-6 years ago       SUBJECTIVE                                                                                                               Patient presents to physical therapy for evaluation and treatment of lower back pain with spinal stenosis 4 weeks and 1 day status post L2-L3 bilateral hemilaminectomy, partial facetectomy, foraminotomies. Per patient the pain is nearly gone and is just having primarily stiffness. In the morning does lower trunk rotation to loosen up and this has been helpful. Had some numbness and tingling in the right leg for 2 weeks following surgery however this has resolved. Patient reports chronic balance concerns and hopes to improves this. Was using walker with long distances after operation however has progressed to cane. Utilizes cane for balance and has been using prior to surgery.     Pain / Symptoms  - Pain rating (out of 10): Current: 1 ; Best: 0; Worst: 3  - Location: Bilateral low back and legs right>left   - Quality / Description: stiff, discomfort  - Alleviating Factors: over-the-counter medication, ice, heat, rest     - Primarily heat     Function:   Limitations / Exacerbation Factors:   - Patient reports pain, difficulty and increased time with function reported below.  - lower body dressing, standing tasks, house/yard work, bending/squatting/lifting, standing, walking, reaching and lifting/carrying, low transfer (toilet/couch), stairs, community distances  - Walking long distances >.25 miles  Needs sock aid assistance for right foot due to inability to bring right foot onto left knee    Prior Level of Function: worsening pain and function, therefore underwent surgery. declining function, therefore referred to therapy,    Patient Goals: improved balance and return to sport/leisure activities. Woodworking  Decrease stiffness.     Prior treatment  - no therapies  - Discharged from hospital, home  health, or skilled nursing facility in last 30 days: no  Home Environment   - Patient lives with: significant other  - Type of home: multiple level home  - Assistance available: consistent  - Denies 2 or more falls or an unexplained fall with injury in the last year.  - Feel safe at home / work / school: yes      OBJECTIVE                                                                                                                    Incision/Wound:   - Location: Incision closed with no redness or swelling  Intact sensation around incision        Range of Motion (ROM)   (degrees unless noted; active unless noted; norms in ( ); negative=lacking to 0, positive=beyond 0)  Hip:   - Internal Rotation (45-50):     - in supine:         Left: -20         Right: 0   - External Rotation (45-50):     - in supine:         Left: 60         Right: 30    Strength  (out of 5 unless noted, standard test position unless noted)   Hip:    - Flexion:         Left: 4         Right: 4    - Abduction:         Left: 4         Right: 4  Knee:    - Flexion:         Left: 4+         Right: 5    - Extension:         Left: 4+         Right: 5  Ankle:    - Dorsiflexion:         Left: 4         Right: 4  First MTP (Hallux):    - Extension:         Left: 4-          Right: 4-          Muscle Length Tests  - 90/90 Hamstring at knee:  - Left:  -50° - Right: -50°    Special Tests  Lumbar: Nerve Tests  - Straight Leg Raise:  Left: negative Right: negative    Deep Tendon Reflexes  - Patellar (L3/4):  Left: 1+ (diminished)  Right: 1+ (diminished)  - Achilles (L5/S1):  Left: 1+ (diminished)  Right: 1+ (diminished)    Sensation/Dermatome Testing:    L1 (back, over trochanter and groin):     - Light Touch:   Left: intact  Right: intact  L2 (back, front of thigh to knee):     - Light Touch:  Left: intact  Right: intact  L3 (back, upper buttock, anterior thigh, knee, medial lower leg):     - Light Touch:  Left: intact  Right: intact  L4 (medial buttock,  mass    Procedure in Detail:    Informed consent was obtained from patient after explanation of procedure, indications, risks, and benefits. Area was numbed with 5 cc of 1% lidocaine with epinephrine and then sterilely prepped and draped. Using a 22G and 18G needle, fluid was aspirated from within the mass and placed along slides to be sent for cytology as well as cultures obtained. Approximately 25cc of purulence was aspirated. She tolerated the procedure well and without complications.      PERTINENT DATA:  OS[facility] CT films reviewed: right sided well circumscribed fluid filled mass    ASSESSMENT:  Sheree Carter is a 49 y.o. 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 In office FNA performed with purulence aspirated. Cultures obtained.    PLAN:  -- Follow up cytology and cultures from needle aspiration performed in clinic  -- Augmentin prescription sent to patient's pharmacy    Return to clinic in 1 week    Lou Gomez MD  [employer] Otolaryngology PGY-3  9:46 AM 10/03/2024     lateral thigh, medial leg, dorsum of foot, great toe):     - Light Touch:  Left: absent  Right: absent  L5 (buttock, posterior and lateral thigh, lateral aspect of leg, dorsum of foot, medial half of sole, 1-3rd toes):     - Light Touch:  Left: absent  Right: absent   S1 (buttock, thigh, posterior leg):     - Light Touch:  Left: intact  Right: intact  Absent sensation in feet and distal lower extremity chronically    Balance Tests   5 Times Sit to Stand (seconds) 13    Norms: 70-79 year old - 4.5 -15.5 sec    Interpretation:        > 12 seconds indicates need for further assessment for fall risk for community dwelling elderly      > 16 seconds indicative of falls risk for Parkinson's disease  Timed Up and Go (TUG)      - Total time to complete: 13 sec, stable on turns     - Assistive device: gait belt used and single point cane    Interpretation: < 10 seconds = normal; > or = 12 seconds is a positive screen for fall risk based on     CDC STEADI tool kit; > or = 13.5 seconds has been shown to indicate high risk for falls     high risk of falls    Transfers  Independent with bed mobility and transfers although increased time to complete    Ambulation / Gait  - Assistive device: none and single point cane  - Assist Level: modified independent  - Description: leans left, leans right, wide base of support, decreased ronn/pace, decreased step length left and decreased step length right              Outcome/Assessments  Outcome Measures:   OSWESTRY Total Scored: 17  OSWESTRY Total Possible Score: 50  OSWESTRY Score Calculated: 34 %  (0-20% = minimal disability; 20-40% = moderate disability; 40-60% = severe disability; 60-80% = crippled; % = bed bound) see flowsheet for additional documentation        Treatment     Therapeutic Exercise  Reviewed injury mechanics/pathology, performed 1 set of below HEP, education on plan of care.      Skilled input: tactile instruction/cues, posture correction, demonstration and  verbal instruction/cues    Writer verbally educated and received verbal consent for hand placement, positioning of patient, and techniques to be performed today from patient for clothing adjustments for techniques, therapist position for techniques and hand placement and palpation for techniques as described above and how they are pertinent to the patient's plan of care.  Home Exercise Program  Access Code: [email or web address]  URL: [email or web address]  Date: 02/05/2025  Prepared by: Klarissa Weller    Exercises  - Supine Posterior Pelvic Tilt  - 1 x daily - 7 x weekly - 3 sets - 10 reps - 5s hold  - Hooklying Single Leg Bent Knee Fallouts with Resistance  - 1 x daily - 7 x weekly - 3 sets - 10 reps  - Supine Hip Adduction Isometric with Towel  - 1 x daily - 7 x weekly - 1-2 sets - 5-10 reps - 5s hold  - Supine Active Straight Leg Raise  - 1 x daily - 7 x weekly - 3 sets - 10 reps  - Seated Hamstring Stretch  - 1 x daily - 7 x weekly - 3 sets - 20-30s hold      ASSESSMENT                                                                                                          78 year old patient has reported functional limitations listed above impacted by signs and symptoms consistent with treatment diagnosis below.  Treatment Diagnosis:   - Involved: lumbar.  - Symptoms/impairments: increased pain/symptoms, impaired strength, impaired posture, impaired range of motion, impaired muscle length/flexibility, impaired gait, impaired mobility, impaired activity tolerance, impaired balance, increased risk for falls, impaired body mechanics and impaired sensation.    Patient presents with post operative findings of lower extremity weakness, decreased lower extremity flexibility, impaired sensation, and decreased balance. Patient with improved pain control from prior to surgery however lingering stiffness. Patient has been compliant with surgeon recommendations and precautions. Patient reports moderate disability  per Oswestry and has had to limit activities and mobility which could lead to further decline and deconditioning without skilled physical therapy intervention. Recommend skilled physical therapy in order to progress mobility, transfers, gait, strength, and balance for increased safety and reduce further disability.     Prognosis: Patient will benefit from skilled therapy.  Rehabilitative potential is:  Predicted patient presentation: Low (stable) - Patient comorbidities and complexities, as defined above, will have little effect on progress for prescribed plan of care.  Education:   - Present and ready to learn: patient  - Results of above outlined education: Verbalizes understanding, Needs reinforcement and Demonstrates understanding    PLAN                                                                                                                         The following skilled interventions to be implemented to achieve goals listed below:  Neuromuscular Re-Education (62566)  Therapeutic Activity (41467)  Therapeutic Exercise (32925)  Manual Therapy (67580)  Electrical Stimulation Unattended (94938 or )  Ultrasound (78727)  Aquatic Therapy (42932)    Frequency / Duration  1 times per week tapering as patient progresses for 12 weeks for an estimated total of 8 visits    Patient involved in and agreed to plan of care and goals.    Suggestions for next session as indicated: Progress per plan of care    Goals  Long Term Goals: to be met by end of plan of care  1. Patient to be able to perform lumbar movement in all planes with minimal increase in stiffness, pain, or discomfort to increase ability to reach and move around home.   2. Patient to demo 4+/5 strength throughout bilateral lower extremities in order to increase strength and reduce fall risk.   3. Patient will ambulate on even surfaces .5 miles or more, with patient reported manageable/tolerable difficulty using a standard cane, modified independently  in order to improve conditioning and increase participation in home and community.  4. Patient will be independent with progressed and modified home exercise program.  5. Oswestry: Patient will score 22% or lower on The Oswestry Disability Index to indicate a decreased level of impairment related to back or leg symptoms. (minimal clinically important difference: 12% of calculated score)      Therapy procedure time and total treatment time can be found documented on the Time Entry flowsheet           Current Participants as of 2/19/2025      Name Type Comments Contact Info    Anjali Zendejas MD PCP - General  [phone]    Signature pending    Klarissa Weller PT Physical Therapist      Electronically signed by Klarissa Weller PT at 2/6/2025 0902 CST            RE: Plan of Care for Glen Carreno Jr., YOB: 1946     I certify the need for these services, furnished under this plan of treatment and while under my care.  I agree with the plan of care as stated and request that therapy proceed.        __________________________________________________________________________________  Provider Signature         Date

## (undated) DEVICE — GLOVE SENSICARE PI GRN 7.5

## (undated) DEVICE — SOLIDIFIER BOTTLE 1500CC

## (undated) DEVICE — ADHESIVE MASTISOL VIAL 48/BX

## (undated) DEVICE — GLOVE SENSICARE PI GRN 8

## (undated) DEVICE — NDL ECLIPSE SAFETY 18GX1-1/2IN

## (undated) DEVICE — MARKER WRITESITE SKIN CHLRAPRP

## (undated) DEVICE — KIT SURGICAL TURNOVER

## (undated) DEVICE — SPONGE GAUZE 16PLY 4X4

## (undated) DEVICE — GLOVE SIGNATURE MICRO LTX 7.5

## (undated) DEVICE — SUT 2/0 30IN SILK BLK BRAI

## (undated) DEVICE — COVER MAYO STND XL 30X57IN

## (undated) DEVICE — SUT SA85H SILK 2-0

## (undated) DEVICE — HOOK LONE STAR SHRP ELAS 5MM

## (undated) DEVICE — GOWN POLY REINF BRTH SLV XL

## (undated) DEVICE — SPONGE KITTNER 1/4X 5/8 L STRL

## (undated) DEVICE — TOWEL OR DISP STRL BLUE 4/PK

## (undated) DEVICE — MANIFOLD 4 PORT

## (undated) DEVICE — PENCIL ELECSURG ROCKER 15FT

## (undated) DEVICE — SYR 10CC LUER LOCK

## (undated) DEVICE — PACK EENT SURG II ECLIPSE

## (undated) DEVICE — CORD BIPOLAR 12 FOOT

## (undated) DEVICE — CLOSURE SKIN STERI STRIP 1/2X4

## (undated) DEVICE — POSITIONER HEAD SUPINE PINK

## (undated) DEVICE — GLOVE SENSICARE PI GRN 6.5

## (undated) DEVICE — ELECTRODE BLADE INSULATED 1 IN

## (undated) DEVICE — SOL NACL IRR 1000ML BTL

## (undated) DEVICE — PAD CURAD NONADH 3X4IN

## (undated) DEVICE — SUT VICRYL PLUS 3-0 SH 18IN

## (undated) DEVICE — TRAY SKIN SCRUB WET PREMIUM

## (undated) DEVICE — SUT MCRYL PLUS 4-0 PS2 27IN

## (undated) DEVICE — ELECTRODE PATIENT RETURN DISP

## (undated) DEVICE — GLOVE SENSICARE NEOPRENE 6.5

## (undated) DEVICE — Device

## (undated) DEVICE — PROTECTOR ONE-STEP ARM REG

## (undated) DEVICE — KIT ANTIFOG W/SPONG & FLUID

## (undated) DEVICE — NDL 27G X 1 1/4

## (undated) DEVICE — TUBING MEDI-VAC 20FT .25IN